# Patient Record
Sex: FEMALE
[De-identification: names, ages, dates, MRNs, and addresses within clinical notes are randomized per-mention and may not be internally consistent; named-entity substitution may affect disease eponyms.]

---

## 2022-07-05 ENCOUNTER — APPOINTMENT (OUTPATIENT)
Dept: OBGYN | Facility: CLINIC | Age: 62
End: 2022-07-05

## 2022-07-05 PROBLEM — Z00.00 ENCOUNTER FOR PREVENTIVE HEALTH EXAMINATION: Status: ACTIVE | Noted: 2022-07-05

## 2022-08-24 ENCOUNTER — RESULT REVIEW (OUTPATIENT)
Age: 62
End: 2022-08-24

## 2022-09-08 ENCOUNTER — APPOINTMENT (OUTPATIENT)
Dept: SURGERY | Facility: CLINIC | Age: 62
End: 2022-09-08

## 2022-11-14 PROBLEM — N63.20 LEFT BREAST MASS: Status: ACTIVE | Noted: 2022-11-14

## 2022-11-14 NOTE — DATA REVIEWED
[FreeTextEntry1] : 	\par Exam requested by:\par JACKLYN WERNER PAC\par [id number]\par [place] [address]\par SITE PERFORMED: [facility]\par SITE PHONE: [phone]\par Patient: DMITRIY HESTER\par YOB: 1960\par Phone: [phone] \par MRN: [id number] Acc: [id number]\par Date of Exam: 08-\par  \par EXAM: DIGITAL BILATERAL DIAGNOSTIC CALLBACK MAMMOGRAM AND TOMOSYNTHESIS AND BREAST ULTRASOUND\par \par HISTORY: The patient is seen for bilateral focal asymmetries at screening mammography \par Age: 61 years old. \par \par COMPARISON: No comparison studies.\par \par MAMMOGRAM:\par \par TECHNIQUE: Full-field digital mammography of bilateral breasts was obtained. Additional imaging is composed of orthogonal spot compression views. Low-dose full-field digital breast tomosynthesis examination was performed with tomosynthesis acquisitions and synthesized 2D reconstructed mammogram. Computer-aided detection (CAD) was utilized.  \par \par FINDINGS:\par BREAST COMPOSITION: There are scattered areas of fibroglandular density.\par \par A right breast central focal asymmetry seen at screening mammography persists on additional mammographic views, which is appears as an oval circumscribed 0.4 cm medial asymmetry at a depth of 5 cm from the nipple.\par \par A left breast medial asymmetry seen at screening mammography is effaced on additional mammographic views.\par \par A left breast central posterior asymmetry seen at screening mammography on the mediolateral oblique projection and persists on additional mammographic views, on which it appears as a circumscribed oval bilobed 1.1 cm nodule at the nipple line at a depth of 7 cm.\par \par BREAST ULTRASOUND:  \par \par TECHNIQUE: Complete bilateral breast ultrasound, with evaluation of the four quadrants, retroareolar regions and axillae, was performed.  \par \par FINDINGS: \par \par \par Right breast\par \par At 1:00 2 cm from the nipple, there is a 0.3 cm cyst which does not appear to correspond to a nodule visualized at mammogram.\par \par Left breast\par \par At 12:00 3 cm from the nipple, there is a 0.6 x 0.3 x 0.6 cm hypoechoic nodule for which biopsy was recommended at ultrasound of 7/21/2022.\par \par At 2:00 10 cm from the nipple, there is a 0.8 x 0.7 x 0.7 cm hyperechoic nodule with a hypoechoic rim suggestive of a nonenlarged intramammary lymph node.\par \par At 6:00 5 cm from the nipple, there is a 0.7 x 0.4 x 0.7 cm hypoechoic nodule with indistinct margins not visualized at prior ultrasound.\par \par \par IMPRESSION:\par \par 1. Left breast 12:00 nodule for which biopsy was previously recommended is reidentified. The patient is scheduled for ultrasound-guided biopsy tomorrow, 8/24/2022.\par 2. Left breast 6:00 nodule with mildly suspicious morphologic features for which histologic correlation is also recommended. This may be performed on the same date. Patient notified of the recommendation for second biopsy at the time of exam.\par 3. Left breast central posterior asymmetry seen at screening mammography persists as a probably benign-appearing nodule at mammogram without a sonographic correlate to permit further characterization. As the mammographic morphology of the nodule suggests that it is probably benign, follow-up is recommended with left diagnostic mammography in 6 months to confirm stability.\par 4. Left breast medial asymmetry seen at screening mammography was a summation shadow artifact, caused by confluence of fibroglandular tissue.\par 5. Left breast 2:00 benign-appearing intramammary lymph node at ultrasound.\par 6. Right breast asymmetry seen at screening mammography persists on additional mammographic views as a probably benign asymmetry. There is no sonographic correlate to permit further characterization. As the mammographic morphology of the asymmetry suggests that it is probably benign, follow-up is recommended with right diagnostic mammography in 6 months to confirm stability. \par \par FOLLOW-UP: Ultrasound guided biopsy. \par \par \par ASSESSMENT: BI-RADS Category 4:  Suspicious. \par \par This examination should not preclude the clinical evaluation of a suspicious palpable abnormality.\par \par As per the FDA requirements, a layman's letter has been generated and sent to your patient stating the results and recommendations of this breast imaging study. We have entered your patient into our reminder system and will notify them when they are due for their next breast imaging exam. \par \suha Thank you for the opportunity to participate in the care of this patient.  \par  \suha GARCIA MD  - Electronically Signed: 08- 4:13 PM \suha Physician to Physician Direct Line is: [phone]

## 2022-11-14 NOTE — HISTORY OF PRESENT ILLNESS
[de-identified] : Patient is a 62 year   -old female  who was referred by Dr. Jocelyne Aparicio with the chief complaint of having a Left   breast mass. \par \par  Patient  had a BL breast  mammogram on 07/21/2022 that was deemed a BIRADS 4. \par  Patient  had a diagnostic  BL breast US and a  mammogram on 08/23/2022 that was deemed a BIRADS 4. \par A sono-guided biopsy left breast (2 sites)   was done on 08/24/202 .  Pathology results were consistent with  Left 6:00  Radial scar lesion. Intraductal papilloma.  Left 12:00  Proliferative fibrocystic change.

## 2022-11-17 ENCOUNTER — APPOINTMENT (OUTPATIENT)
Dept: SURGERY | Facility: CLINIC | Age: 62
End: 2022-11-17

## 2022-11-17 DIAGNOSIS — N63.20 UNSPECIFIED LUMP IN THE LEFT BREAST, UNSPECIFIED QUADRANT: ICD-10-CM
